# Patient Record
Sex: FEMALE | Race: WHITE
[De-identification: names, ages, dates, MRNs, and addresses within clinical notes are randomized per-mention and may not be internally consistent; named-entity substitution may affect disease eponyms.]

---

## 2018-09-15 ENCOUNTER — HOSPITAL ENCOUNTER (EMERGENCY)
Dept: HOSPITAL 80 - FED | Age: 53
Discharge: HOME | End: 2018-09-15
Payer: COMMERCIAL

## 2018-09-15 VITALS — SYSTOLIC BLOOD PRESSURE: 148 MMHG | DIASTOLIC BLOOD PRESSURE: 109 MMHG

## 2018-09-15 DIAGNOSIS — S61.101A: Primary | ICD-10-CM

## 2018-09-15 DIAGNOSIS — Z23: ICD-10-CM
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
## 2018-09-15 PROCEDURE — 3E0T3BZ INTRODUCTION OF ANESTHETIC AGENT INTO PERIPHERAL NERVES AND PLEXI, PERCUTANEOUS APPROACH: ICD-10-PCS

## 2018-09-15 NOTE — EDPHY
General


Time Seen by Provider: 09/15/18 18:32


Narrative: 





CHIEF COMPLAINT: 


"I cut the tip of my thumb off"





HISTORY OF PRESENT ILLNESS: 


Patient presents with complaints of accidental laceration to the left thumb.  

She was cutting onions less than 1 hr ago when she accidentally cut the tip of 

the left thumb off.  Moderately painful she touches it.  Minimal at rest.  Does 

not radiate.  Bleeding was difficult to stop until just prior to arrival.  She 

applied Neosporin to and and a pressure dressing.  She has no difficulty 

bending or straightening the thumb.  No injury elsewhere.  Tetanus up-to-date.  

No other associated complaints or modifying factors





TIME OF INJURY:


Less than 1 hr ago





TETANUS STATUS:


Uncertain





MEDICAL/SURGICAL/SOCIAL HISTORY:


Hypothyroid.  No recent surgical history.  Nonsmoker.  Lives independently with 

her spouse.





REVIEW OF SYSTEMS:


Ten systems reviewed and are negative unless otherwise noted in the HPI





EXAMINATION


General Appearance:  Alert, no distress


Head: normocephalic, atraumatic


Cardiovascular:  Pulses normal throughout. Brisk cap refill


Neurological:  A&O, sensory symmetric, strength symmetric


Skin:  Warm and dry, no rash


Extremities:  Nontender, no pedal edema





DIFFERENTIAL DIAGNOSES:


Including but not limited to tissue avulsion, finger to laceration, laceration 

with deep structure injury








MDM:


6:30 p.m.


Avulsion of the left thumb pad from knife less than 1 hr ago.  Her tetanus is 

not up-to-date.  She is neuro intact distally.  I have administered a digital 

block.  We will irrigate the wound and obtain hemostasis.  No deficits.





6:50 p.m.


Wound is being irrigated at this time.  She has an avulsion of the tip of the 

right thumb measuring 1 x 1 cm with very small involvement of the nail.  No 

injury to the nail bed.  There is no laceration that requires suture repair as 

the finger tip was avulsed and is too small to be vascular intact.  She has 

surgeon foam that is currently being placed.  A bulky dressing we placed and we 

will monitor for hemostasis.  Full flexion extension retained.  Full opposition

, adduction, abduction of the thumb intact.





7:00 p.m.


Wound has been dressed and I have re-evaluated her.  There is no bleeding we 

discussed the above and she will be discharged home in stable condition.  Wound 

care discussed.  Follow-up discussed.  Prepack of pain medication provided as 

the pharmacies are closed at this time.  I have answered all her questions.  

She is well-appearing.  There is no bleeding.








PROCEDURE: Digital Block


Indication: Finger laceration


Consent:  Verbal


Location:  Left thumb


Anesthesia: Lidocaine 1% plain, 0.25% Marcaine plain, 5mL


Description:  Base of the finger was prepped.  The above was infused without 

difficulty in a ring block fashion.  Tolerated well.  Good anesthesia.


Complications: None








SUPERVISION:


This patient was independently evaluated without direct involvement of or 

examination by the attending physician. 





ED Precautions: 


Worsening pain. Erythema, edema, cyanosis, pallor, paresthesia or anesthesia.











- History


Smoking Status: Never smoked





- Objective


Vital Signs: 


 Initial Vital Signs











Temperature (C)  98.1 F   09/15/18 18:01


 


Heart Rate  71   09/15/18 18:01


 


Respiratory Rate  18   09/15/18 18:01


 


Blood Pressure  155/100 H  09/15/18 18:01


 


O2 Sat (%)  95   09/15/18 18:01








 











O2 Delivery Mode               Room Air














Allergies/Adverse Reactions: 


 





No Known Allergies Allergy (Unverified 09/15/18 18:00)


 








Home Medications: 














 Medication  Instructions  Recorded


 


Levothyroxine  09/15/18


 


Venlafaxine Xr  09/15/18











Medications Given: 


 








Discontinued Medications





Diphtheria/Tetanus/Acell Pertussis (Boostrix)  0.5 ml IM .ONCE ONE


   Stop: 09/15/18 18:50


   Last Admin: 09/15/18 18:59 Dose:  0.5 ml


Ibuprofen (Motrin)  600 mg PO EDNOW ONE


   Stop: 09/15/18 18:06


   Last Admin: 09/15/18 18:07 Dose:  600 mg


Oxycodone/Acetaminophen (Percocet 5/325mg Prepack#4)  1 btl TAKEHOME EDNOW ONE


   Stop: 09/15/18 19:04


   Last Admin: 09/15/18 19:14 Dose:  1 btl








Departure





- Departure


Disposition: Home, Routine, Self-Care


Clinical Impression: 


Avulsion of fingertip


Qualifiers:


 Encounter type: initial encounter Qualified Code(s): S61.209A - Unspecified 

open wound of unspecified finger without damage to nail, initial encounter





Condition: Good


Instructions:  Oxycodone/Acetaminophen (By mouth), Skin Avulsion (ED)


Additional Instructions: 


1. Daily wound care with dressing changes and layer of bacitracin applied once 

daily.  You may leave the current dressing in place for up to 48 hr


2. ED precautions for any bleeding, redness, warmth or difficulty using the 

thumb


3. Contact hand surgeon or healing center for outpatient definitive care  


Referrals: 


Rachel Chambers MD [Primary Care Provider] - As per Instructions


[facility] [Clinic] - As per Instructions


Tian Mleendez MD [Medical Doctor] - As per Instructions

## 2019-01-08 ENCOUNTER — HOSPITAL ENCOUNTER (OUTPATIENT)
Dept: HOSPITAL 80 - FIMAGING | Age: 54
End: 2019-01-08
Attending: FAMILY MEDICINE
Payer: COMMERCIAL

## 2019-01-08 DIAGNOSIS — Z12.31: Primary | ICD-10-CM
